# Patient Record
Sex: MALE | Race: WHITE
[De-identification: names, ages, dates, MRNs, and addresses within clinical notes are randomized per-mention and may not be internally consistent; named-entity substitution may affect disease eponyms.]

---

## 2019-08-27 ENCOUNTER — HOSPITAL ENCOUNTER (OUTPATIENT)
Dept: HOSPITAL 84 - D.MRI | Age: 69
Discharge: HOME | End: 2019-08-27
Attending: ORTHOPAEDIC SURGERY
Payer: COMMERCIAL

## 2019-08-27 VITALS — BODY MASS INDEX: 35.6 KG/M2 | BODY MASS INDEX: 38.8 KG/M2

## 2019-08-27 DIAGNOSIS — M75.122: Primary | ICD-10-CM

## 2019-09-18 LAB
ANION GAP SERPL CALC-SCNC: 11.3 MMOL/L (ref 8–16)
APTT BLD: 33.9 SECONDS (ref 22.8–39.4)
BUN SERPL-MCNC: 20 MG/DL (ref 7–18)
CALCIUM SERPL-MCNC: 9.4 MG/DL (ref 8.5–10.1)
CHLORIDE SERPL-SCNC: 104 MMOL/L (ref 98–107)
CO2 SERPL-SCNC: 29.2 MMOL/L (ref 21–32)
CREAT SERPL-MCNC: 0.9 MG/DL (ref 0.6–1.3)
ERYTHROCYTE [DISTWIDTH] IN BLOOD BY AUTOMATED COUNT: 12.6 % (ref 11.5–14.5)
GLUCOSE SERPL-MCNC: 133 MG/DL (ref 74–106)
HCT VFR BLD CALC: 43.6 % (ref 42–54)
HGB BLD-MCNC: 15.1 G/DL (ref 13.5–17.5)
INR PPP: 1 (ref 0.85–1.17)
MCH RBC QN AUTO: 30.2 PG (ref 26–34)
MCHC RBC AUTO-ENTMCNC: 34.6 G/DL (ref 31–37)
MCV RBC: 87.2 FL (ref 80–100)
OSMOLALITY SERPL CALC.SUM OF ELEC: 283 MOSM/KG (ref 275–300)
PLATELET # BLD: 214 10X3/UL (ref 130–400)
PMV BLD AUTO: 9.6 FL (ref 7.4–10.4)
POTASSIUM SERPL-SCNC: 4.5 MMOL/L (ref 3.5–5.1)
PROTHROMBIN TIME: 12.7 SECONDS (ref 11.6–15)
RBC # BLD AUTO: 5 10X6/UL (ref 4.2–6.1)
SODIUM SERPL-SCNC: 140 MMOL/L (ref 136–145)
WBC # BLD AUTO: 6.4 10X3/UL (ref 4.8–10.8)

## 2019-09-20 ENCOUNTER — HOSPITAL ENCOUNTER (OUTPATIENT)
Dept: HOSPITAL 84 - D.OPS | Age: 69
Discharge: HOME | End: 2019-09-20
Attending: ORTHOPAEDIC SURGERY
Payer: COMMERCIAL

## 2019-09-20 VITALS — BODY MASS INDEX: 35.7 KG/M2 | HEIGHT: 71 IN | WEIGHT: 255 LBS

## 2019-09-20 VITALS — DIASTOLIC BLOOD PRESSURE: 97 MMHG | SYSTOLIC BLOOD PRESSURE: 136 MMHG

## 2019-09-20 DIAGNOSIS — M75.42: ICD-10-CM

## 2019-09-20 DIAGNOSIS — M65.342: Primary | ICD-10-CM

## 2019-09-20 DIAGNOSIS — S43.492A: ICD-10-CM

## 2019-09-20 NOTE — NUR
1100-DISCHARGE CRITERIA MET.REMOVED IV FROM LEFT HAND WITH CATH
INTACT,DISPOSED INTO SHAPRS. COVERED SITE WITH BANDAID. DRESSING TO
LEFT SHOULDER AND LEFT HAND CDI.CAP REFILL WNL,ABLE TO WIGGLE DIGITS.
SKIN PINK,WDI. REVIEWED POST OPERATIVE INSTRUCTIONS WITH PT AND
FRIEND AT BEDSIDE.VERBALIZED UNDERSTANDING WITHOUT QUESTIONS OR
CONCERNS. ESCORTED OUT VIA W/C STABLE CONDITION. FRIEND TO DRIVE HOME

## 2019-09-20 NOTE — NUR
1030-TOLERATED FULL LIQUID TRAY. VSS. DRESSING TO LEFT WRIST AND LEFT
SHOULDER CDI. CAP REFILL WNL,ABLE TO WIGGLE DIGITS, DENIES PAIN.
PULSES STRONG AND REGULAR. VSS.

## 2019-09-20 NOTE — NUR
0957-REC'D FROM RR. AWAKE AND ALERT WITHOUT COMPLAINTS. VSS. DRESSING
TO LEFT SHOULDER AND LEFT HAND CDI. FRIEND AT BEDSIDE, CL IN EASY
REACH

## 2019-09-20 NOTE — OP
PATIENT NAME:  KENYETTA AGUILAR                          MEDICAL RECORD: [id number]
:50                                             LOCATION:D.OPS          
                                                         ADMISSION DATE:        
SURGEON:  VENU PRUETT DO         
 
 
DATE OF OPERATION:  2019
 
PROCEDURE PERFORMED:  Left ring finger A1 pulley release, left shoulder
arthroscopy with distal clavicle excision, subacromial decompression, biceps
tenodesis and labral debridement.
 
PREOPERATIVE DIAGNOSES:  Left shoulder superior labrum anterior and posterior
tear, subacromial impingement, acromioclavicular joint arthritis and left ring
finger trigger finger.
 
POSTOPERATIVE DIAGNOSES:  Left shoulder superior labrum anterior and posterior
tear, subacromial impingement, acromioclavicular joint arthritis and left ring
finger trigger finger.
 
INDICATIONS:  Mr. Aguilar is a 68-year-old male who has been dealing with these
problems for quite some time.  He has tried all manner of nonoperative treatment
for the trigger and the shoulder including injections to no avail.  He wants
something done surgically.  I informed him of the risks and benefits including
infection, bleeding, damage to nerves and vessels, need for further surgery,
continued pain and blood clots, and even death and he signed the consent.
 
SURGEON:  Venu Pruett DO
 
DESCRIPTION OF PROCEDURE:  The patient was taken to the operative suite, laid in
the right lateral decubitus position with the left shoulder up.  A timeout was
performed, everyone was in agreement with the correct side, site, patient, and
procedure.  He received 900 mg of clindamycin preoperatively.  The left shoulder
and left upper extremity was prepped and draped.  Once a timeout was performed,
an Esmarch was used to exsanguinate the forearm and was on the forearm for
approximately 10 minutes.  Incision was made in the palm of the left hand, over
the flexor tendon and the ring finger in the distal crease.  Careful dissection
was made.  Once the incision was made with a 15 blade scalpel with Joel to
the A1 pulley, the A1 pulley was then released under loupe magnification from
proximal to distal, getting a nice release of it, pulling the tendon out through
the incision to ensure it did not catch or lock or pop and it did not.  This was
then were injected with approximately 5 mL of 0.25% Marcaine with epinephrine
and then closed with 4-0 nylon in a horizontal mattress fashion.  I then placed
an Adaptic, 4 x 4's, Kerlix and a Coban lightly wrapped on the hand.  He was
then placed in the mahajan for the shoulder and the shoulder was marked out and
then the shoulder was inflated with 60 mL of normal saline.  Posterior portal
was then established with an 11-blade scalpel after the 18-gauge needle had been
put in.  Then, the trocar was entered into the shoulder joint.  The camera was
entered.  The anterior portal was established with an 18-gauge spinal needle and
11-blade scalpel.  The SLAP tear seen right away.  The rotator cuff on the
articular side looked to be in good shape as far as supraspinatus, infraspinatus
and subscapularis.  The joint did not have much arthritis and the inferior
gutter was clear of debris.  The burner was then brought into the anterior
portal and the bicep tenotomy was performed as well as the labral debridement. 
I then went to the subacromial space.  Subacromial decompression was done after
establishing a lateral portal, 18-gauge spinal needle and 11-blade scalpel. 
Then, the decompression was done and then the burner was brought in through the
anterior portal to do the distal clavicle excision, opening up the AC joint,
 
 
 
OPERATIVE REPORT                               [id number]    KENYETTA AGUILAR        
 
 
approximately 7 mm.  This AC joint was fused together essentially.  The rotator
cuff was then inspected thoroughly on the bursal side and no full-thickness or
partial thickness tears were noted.  The attention was then drawn to the biceps
tenodesis site and an incision was made along the anterior humerus.  Careful
dissection was made down to the long head of bicep tendon.  This was pulled out
through the incision and any bleeding was coagulated with a Bovie at that time. 
The tendon was then whipstitched and a unicortical hole was placed in the
humerus and cinched down to the button and then placed on the whipstitched
tendon and into the unicortical hole of the humerus.  They secured it very
nicely and then this was tied and then a free needle was used to go back through
the tendon and tied this down, securing it well into place.  The excess tendon
and suture were cut and the site was irrigated thoroughly with normal saline and
that site was closed with 2-0 Vicryl in an inverted interrupted fashion, 4-0
Monocryl ran on the skin and a Dermabond placed on the portal sites and closed
with 4-0 Monocryl in an inverted interrupted fashion and Dermabond placed on it.
 The shoulder was then dressed with a standard dressing and the patient was
awakened, put in a sling and taken to recovery in stable condition.  Blood loss
was approximately 20 mL.
 
COMPLICATIONS:  None.
 
TRANSINT:[id number] Voice Confirmation ID: [id number] DOCUMENT ID: [id number]
                                           
                                           VENU PRUETT DO         
 
 
 
Electronically Signed by VENU MORALES on 19 at 1517
 
 
 
 
 
 
 
 
 
 
 
 
 
 
 
 
 
 
 
CC:                                                             [id number]
DICTATION DATE: 19     :     19 1448      [facility] 
                                                                      19
[facility]                                          
[address] [place] [address]

## 2019-12-30 ENCOUNTER — HOSPITAL ENCOUNTER (OUTPATIENT)
Dept: HOSPITAL 84 - D.OPS | Age: 69
Discharge: HOME | End: 2019-12-30
Attending: INTERNAL MEDICINE
Payer: COMMERCIAL

## 2019-12-30 VITALS — WEIGHT: 269.57 LBS | BODY MASS INDEX: 37.74 KG/M2 | HEIGHT: 71 IN | BODY MASS INDEX: 37.74 KG/M2

## 2019-12-30 VITALS — DIASTOLIC BLOOD PRESSURE: 81 MMHG | SYSTOLIC BLOOD PRESSURE: 121 MMHG

## 2019-12-30 DIAGNOSIS — K92.1: ICD-10-CM

## 2019-12-30 DIAGNOSIS — K64.8: ICD-10-CM

## 2019-12-30 DIAGNOSIS — Z86.010: Primary | ICD-10-CM

## 2019-12-30 LAB
ANION GAP SERPL CALC-SCNC: 14.4 MMOL/L (ref 8–16)
BUN SERPL-MCNC: 14 MG/DL (ref 7–18)
CALCIUM SERPL-MCNC: 8.9 MG/DL (ref 8.5–10.1)
CHLORIDE SERPL-SCNC: 103 MMOL/L (ref 98–107)
CO2 SERPL-SCNC: 25.7 MMOL/L (ref 21–32)
CREAT SERPL-MCNC: 0.9 MG/DL (ref 0.6–1.3)
ERYTHROCYTE [DISTWIDTH] IN BLOOD BY AUTOMATED COUNT: 13.1 % (ref 11.5–14.5)
GLUCOSE SERPL-MCNC: 126 MG/DL (ref 74–106)
HCT VFR BLD CALC: 40.7 % (ref 42–54)
HGB BLD-MCNC: 13.8 G/DL (ref 13.5–17.5)
MCH RBC QN AUTO: 29.4 PG (ref 26–34)
MCHC RBC AUTO-ENTMCNC: 33.9 G/DL (ref 31–37)
MCV RBC: 86.8 FL (ref 80–100)
OSMOLALITY SERPL CALC.SUM OF ELEC: 280 MOSM/KG (ref 275–300)
PLATELET # BLD: 191 10X3/UL (ref 130–400)
PMV BLD AUTO: 9.8 FL (ref 7.4–10.4)
POTASSIUM SERPL-SCNC: 4.1 MMOL/L (ref 3.5–5.1)
RBC # BLD AUTO: 4.69 10X6/UL (ref 4.2–6.1)
SODIUM SERPL-SCNC: 139 MMOL/L (ref 136–145)
WBC # BLD AUTO: 5.6 10X3/UL (ref 4.8–10.8)

## 2019-12-30 NOTE — OP
PATIENT NAME:  KENYETTA AGUILAR                          MEDICAL RECORD: [id number]
:50                                             LOCATION:D.[facility]          
                                                         ADMISSION DATE:        
SURGEON:  MARQUISE MCKEON DO             
 
 
DATE OF OPERATION:  2019
 
PROCEDURE:  Colonoscopy with polypectomy.
 
INDICATIONS FOR PROCEDURE:  History of colon polyps, hematochezia, history of
internal hemorrhoids.
 
SCOPE:  Olympus video pediatric colonoscope.
 
MEDICATIONS:  Propofol 350 mg IV per anesthesia.
 
WITHDRAWAL TIME:  16 minutes.
 
ESTIMATED BLOOD LOSS:  Minimal.
 
COMPLICATIONS:  None.
 
FINDINGS:  Informed consent was given.  The patient was made comfortable with
the above medication.  After reaching an adequate level of sedation by slow IV
push, the patient was placed on his left side.  A digital rectal examination was
performed and was normal.  The endoscope was then advanced under direct
visualization through the rectum to the cecum, confirmed by the presence of the
appendiceal orifice and ileocecal valve.  The endoscope was slowly withdrawn and
mucosa was carefully examined.  The prep quality was poor.  There was limited
visualization of multiple areas of the colon due to some solid stool still being
present within the colon that was unable to be removed.  There were 4 polyps
visualized on today's examination.  Two were located in the cecum.  They were
both benign-appearing and sessile.  They ranged in size from 2-4 mm in diameter.
 They were both removed using hot forceps.  One of the site was reinforced using
endoclips times 2 successfully.  In the sigmoid colon, there were two
benign-appearing sessile polyps, which were likely hyperplastic.  They ranged in
size from 2-3 mm in diameter.  They were removed using hot forceps. 
Retroflexion was performed in the rectum with visualization of grade I internal
hemorrhoids.  Also, in the rectum, there were AVMs/angio ectasias consistent
with radiation proctitis which is likely the cause of the patient's intermittent
hematochezia while on Plavix.  No diverticula were specifically seen today.  The
endoscope was removed from the patient.  The patient tolerated the procedure
well and there were no complications.
 
IMPRESSION:
1.  Four polyps as described above, removed using hot forceps.
2.  Grade I internal hemorrhoids without bleeding.
3.  Radiation proctitis.
4.  Inadequate prep for completion of today's colonoscopy.
 
PLAN AND RECOMMENDATIONS:
1.  Discharge home when recovery parameters are met.
2.  Follow up biopsy specimen results.
3.  High fiber diet.
4.  Continue current medications.
5.  Resume Plavix, but monitor for further bleeding.
6.  We will reschedule the patient for another colonoscopy at his earliest
 
 
 
OPERATIVE REPORT                               [id number]    KENYETTA AGUILAR and plan to perform argon plasma coagulation for the radiation
proctitis.  We will likely utilize a MiraLax prep as the patient did not
tolerate soup prep well and it did not work adequately for the patient.
 
TRANSINT:[id number] Voice Confirmation ID: [id number] DOCUMENT ID: [id number]
                                           
                                           MARQUISE MCKEON DO             
 
 
 
Electronically Signed by MARQUISE CAMPO on 19 at 1714
 
 
 
 
 
 
 
 
 
 
 
 
 
 
 
 
 
 
 
 
 
 
 
 
 
 
 
 
 
 
 
 
 
 
 
 
CC:                                                             [id number]
DICTATION DATE: 19 1017     :     19 1517      [facility] 
                                                                      19
[facility]                                          
[address] [place] [address]

## 2020-01-04 ENCOUNTER — HOSPITAL ENCOUNTER (EMERGENCY)
Dept: HOSPITAL 84 - D.ER | Age: 70
Discharge: OTHER INSTITUTION | End: 2020-01-04
Payer: COMMERCIAL

## 2020-01-04 VITALS
HEIGHT: 71 IN | BODY MASS INDEX: 36.4 KG/M2 | WEIGHT: 260 LBS | BODY MASS INDEX: 36.4 KG/M2 | HEIGHT: 71 IN | WEIGHT: 260 LBS

## 2020-01-04 VITALS — SYSTOLIC BLOOD PRESSURE: 99 MMHG | DIASTOLIC BLOOD PRESSURE: 50 MMHG

## 2020-01-04 DIAGNOSIS — D62: ICD-10-CM

## 2020-01-04 DIAGNOSIS — I10: ICD-10-CM

## 2020-01-04 DIAGNOSIS — K92.2: Primary | ICD-10-CM

## 2020-01-04 DIAGNOSIS — E11.9: ICD-10-CM

## 2020-01-04 DIAGNOSIS — I25.10: ICD-10-CM

## 2020-01-04 DIAGNOSIS — E78.5: ICD-10-CM

## 2020-01-04 LAB
ALBUMIN SERPL-MCNC: 2.8 G/DL (ref 3.4–5)
ALP SERPL-CCNC: 53 U/L (ref 46–116)
ALT SERPL-CCNC: 23 U/L (ref 10–68)
ANION GAP SERPL CALC-SCNC: 14.8 MMOL/L (ref 8–16)
APTT BLD: 30.1 SECONDS (ref 22.8–39.4)
BASOPHILS NFR BLD AUTO: 0.2 % (ref 0–2)
BILIRUB SERPL-MCNC: 0.24 MG/DL (ref 0.2–1.3)
BUN SERPL-MCNC: 31 MG/DL (ref 7–18)
CALCIUM SERPL-MCNC: 7.7 MG/DL (ref 8.5–10.1)
CHLORIDE SERPL-SCNC: 109 MMOL/L (ref 98–107)
CO2 SERPL-SCNC: 21.2 MMOL/L (ref 21–32)
CREAT SERPL-MCNC: 1.3 MG/DL (ref 0.6–1.3)
EOSINOPHIL NFR BLD: 0.8 % (ref 0–7)
ERYTHROCYTE [DISTWIDTH] IN BLOOD BY AUTOMATED COUNT: 13.4 % (ref 11.5–14.5)
GLOBULIN SER-MCNC: 2.4 G/L
GLUCOSE SERPL-MCNC: 235 MG/DL (ref 74–106)
HCT VFR BLD CALC: 27 % (ref 42–54)
HGB BLD-MCNC: 8.6 G/DL (ref 13.5–17.5)
IMM GRANULOCYTES NFR BLD: 0.2 % (ref 0–5)
INR PPP: 1.29 (ref 0.85–1.17)
LYMPHOCYTES NFR BLD AUTO: 9.9 % (ref 15–50)
MCH RBC QN AUTO: 28.6 PG (ref 26–34)
MCHC RBC AUTO-ENTMCNC: 31.9 G/DL (ref 31–37)
MCV RBC: 89.7 FL (ref 80–100)
MONOCYTES NFR BLD: 4.1 % (ref 2–11)
NEUTROPHILS NFR BLD AUTO: 84.8 % (ref 40–80)
OSMOLALITY SERPL CALC.SUM OF ELEC: 293 MOSM/KG (ref 275–300)
PLATELET # BLD: 167 10X3/UL (ref 130–400)
PMV BLD AUTO: 10 FL (ref 7.4–10.4)
POTASSIUM SERPL-SCNC: 5 MMOL/L (ref 3.5–5.1)
PROT SERPL-MCNC: 5.2 G/DL (ref 6.4–8.2)
PROTHROMBIN TIME: 15.6 SECONDS (ref 11.6–15)
RBC # BLD AUTO: 3.01 10X6/UL (ref 4.2–6.1)
SODIUM SERPL-SCNC: 140 MMOL/L (ref 136–145)
WBC # BLD AUTO: 8.3 10X3/UL (ref 4.8–10.8)

## 2020-01-08 ENCOUNTER — HOSPITAL ENCOUNTER (EMERGENCY)
Dept: HOSPITAL 84 - D.ER | Age: 70
Discharge: HOME | End: 2020-01-08
Payer: COMMERCIAL

## 2020-01-08 VITALS — BODY MASS INDEX: 36.48 KG/M2 | WEIGHT: 260.55 LBS | HEIGHT: 71 IN

## 2020-01-08 VITALS — SYSTOLIC BLOOD PRESSURE: 102 MMHG | DIASTOLIC BLOOD PRESSURE: 71 MMHG

## 2020-01-08 DIAGNOSIS — K21.9: ICD-10-CM

## 2020-01-08 DIAGNOSIS — I10: ICD-10-CM

## 2020-01-08 DIAGNOSIS — Z71.1: Primary | ICD-10-CM

## 2020-01-08 DIAGNOSIS — K92.2: ICD-10-CM

## 2020-01-08 DIAGNOSIS — Z87.442: ICD-10-CM

## 2020-01-08 DIAGNOSIS — D64.9: ICD-10-CM

## 2020-01-08 DIAGNOSIS — E11.9: ICD-10-CM

## 2020-01-08 DIAGNOSIS — Z95.5: ICD-10-CM

## 2020-01-08 DIAGNOSIS — E78.5: ICD-10-CM

## 2020-01-08 DIAGNOSIS — I25.10: ICD-10-CM

## 2020-01-08 LAB
ALBUMIN SERPL-MCNC: 3.2 G/DL (ref 3.4–5)
ALP SERPL-CCNC: 57 U/L (ref 46–116)
ALT SERPL-CCNC: 29 U/L (ref 10–68)
ANION GAP SERPL CALC-SCNC: 13.2 MMOL/L (ref 8–16)
BASOPHILS NFR BLD AUTO: 0.3 % (ref 0–2)
BILIRUB SERPL-MCNC: 0.5 MG/DL (ref 0.2–1.3)
BUN SERPL-MCNC: 21 MG/DL (ref 7–18)
CALCIUM SERPL-MCNC: 8.4 MG/DL (ref 8.5–10.1)
CHLORIDE SERPL-SCNC: 104 MMOL/L (ref 98–107)
CO2 SERPL-SCNC: 25.8 MMOL/L (ref 21–32)
CREAT SERPL-MCNC: 0.9 MG/DL (ref 0.6–1.3)
EOSINOPHIL NFR BLD: 3.8 % (ref 0–7)
ERYTHROCYTE [DISTWIDTH] IN BLOOD BY AUTOMATED COUNT: 14 % (ref 11.5–14.5)
GLOBULIN SER-MCNC: 3.3 G/L
GLUCOSE SERPL-MCNC: 101 MG/DL (ref 74–106)
HCT VFR BLD CALC: 25.2 % (ref 42–54)
HGB BLD-MCNC: 8.2 G/DL (ref 13.5–17.5)
IMM GRANULOCYTES NFR BLD: 0.3 % (ref 0–5)
INR PPP: 1.02 (ref 0.85–1.17)
LYMPHOCYTES NFR BLD AUTO: 17.5 % (ref 15–50)
MCH RBC QN AUTO: 29.1 PG (ref 26–34)
MCHC RBC AUTO-ENTMCNC: 32.5 G/DL (ref 31–37)
MCV RBC: 89.4 FL (ref 80–100)
MONOCYTES NFR BLD: 10 % (ref 2–11)
NEUTROPHILS NFR BLD AUTO: 68.1 % (ref 40–80)
OSMOLALITY SERPL CALC.SUM OF ELEC: 280 MOSM/KG (ref 275–300)
PLATELET # BLD: 209 10X3/UL (ref 130–400)
PMV BLD AUTO: 9.4 FL (ref 7.4–10.4)
POTASSIUM SERPL-SCNC: 4 MMOL/L (ref 3.5–5.1)
PROT SERPL-MCNC: 6.5 G/DL (ref 6.4–8.2)
PROTHROMBIN TIME: 13.4 SECONDS (ref 11.6–15)
RBC # BLD AUTO: 2.82 10X6/UL (ref 4.2–6.1)
SODIUM SERPL-SCNC: 139 MMOL/L (ref 136–145)
WBC # BLD AUTO: 6 10X3/UL (ref 4.8–10.8)

## 2020-01-27 ENCOUNTER — HOSPITAL ENCOUNTER (OUTPATIENT)
Dept: HOSPITAL 84 - D.LABREF | Age: 70
Discharge: HOME | End: 2020-01-27
Attending: INTERNAL MEDICINE
Payer: COMMERCIAL

## 2020-01-27 VITALS — BODY MASS INDEX: 36.3 KG/M2

## 2020-01-27 DIAGNOSIS — D64.9: Primary | ICD-10-CM
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
## 2020-01-27 LAB
BASOPHILS NFR BLD AUTO: 0.4 % (ref 0–2)
EOSINOPHIL NFR BLD: 2.4 % (ref 0–7)
ERYTHROCYTE [DISTWIDTH] IN BLOOD BY AUTOMATED COUNT: 14.9 % (ref 11.5–14.5)
HCT VFR BLD CALC: 32.7 % (ref 42–54)
HGB BLD-MCNC: 10.2 G/DL (ref 13.5–17.5)
IMM GRANULOCYTES NFR BLD: 0.3 % (ref 0–5)
LYMPHOCYTES NFR BLD AUTO: 24.3 % (ref 15–50)
MCH RBC QN AUTO: 27.1 PG (ref 26–34)
MCHC RBC AUTO-ENTMCNC: 31.2 G/DL (ref 31–37)
MCV RBC: 87 FL (ref 80–100)
MONOCYTES NFR BLD: 8.5 % (ref 2–11)
NEUTROPHILS NFR BLD AUTO: 64.1 % (ref 40–80)
PLATELET # BLD: 278 10X3/UL (ref 130–400)
PMV BLD AUTO: 9.2 FL (ref 7.4–10.4)
RBC # BLD AUTO: 3.76 10X6/UL (ref 4.2–6.1)
WBC # BLD AUTO: 7.6 10X3/UL (ref 4.8–10.8)